# Patient Record
Sex: MALE | Race: WHITE | HISPANIC OR LATINO
[De-identification: names, ages, dates, MRNs, and addresses within clinical notes are randomized per-mention and may not be internally consistent; named-entity substitution may affect disease eponyms.]

---

## 2023-12-14 ENCOUNTER — EXTERNAL LAB (OUTPATIENT)
Dept: HEALTH INFORMATION MANAGEMENT | Facility: OTHER | Age: 68
End: 2023-12-14

## 2023-12-14 LAB — BNP SERPL-MCNC: 25 PG/ML

## 2024-03-18 ENCOUNTER — EXTERNAL RECORD (OUTPATIENT)
Dept: HEALTH INFORMATION MANAGEMENT | Facility: OTHER | Age: 69
End: 2024-03-18

## 2024-06-03 ENCOUNTER — EXTERNAL RECORD (OUTPATIENT)
Dept: HEALTH INFORMATION MANAGEMENT | Facility: OTHER | Age: 69
End: 2024-06-03

## 2024-12-14 ENCOUNTER — HOSPITAL ENCOUNTER (EMERGENCY)
Facility: HOSPITAL | Age: 69
Discharge: HOME | End: 2024-12-14
Attending: EMERGENCY MEDICINE
Payer: MEDICARE

## 2024-12-14 ENCOUNTER — APPOINTMENT (OUTPATIENT)
Dept: CT IMAGING | Facility: HOSPITAL | Age: 69
End: 2024-12-14
Payer: MEDICARE

## 2024-12-14 VITALS
DIASTOLIC BLOOD PRESSURE: 93 MMHG | HEART RATE: 58 BPM | RESPIRATION RATE: 14 BRPM | SYSTOLIC BLOOD PRESSURE: 136 MMHG | OXYGEN SATURATION: 97 % | TEMPERATURE: 98 F | WEIGHT: 197.31 LBS | HEIGHT: 70 IN | BODY MASS INDEX: 28.25 KG/M2

## 2024-12-14 DIAGNOSIS — G51.0 BELL'S PALSY: Primary | ICD-10-CM

## 2024-12-14 LAB — GLUCOSE BLDC GLUCOMTR-MCNC: 99 MG/DL (ref 70–99)

## 2024-12-14 PROCEDURE — 99283 EMERGENCY DEPT VISIT LOW MDM: CPT

## 2024-12-14 PROCEDURE — 82962 GLUCOSE BLOOD TEST: CPT

## 2024-12-14 PROCEDURE — 99284 EMERGENCY DEPT VISIT MOD MDM: CPT

## 2024-12-14 RX ORDER — ARTIFICIAL TEARS 1; 2; 3 MG/ML; MG/ML; MG/ML
1 SOLUTION/ DROPS OPHTHALMIC 4 TIMES DAILY PRN
Qty: 15 ML | Refills: 12 | Status: SHIPPED | OUTPATIENT
Start: 2024-12-14 | End: 2025-12-14

## 2024-12-14 RX ORDER — PREDNISONE 20 MG/1
60 TABLET ORAL DAILY
Qty: 21 TABLET | Refills: 0 | Status: SHIPPED | OUTPATIENT
Start: 2024-12-14 | End: 2024-12-21

## 2024-12-14 RX ORDER — ATORVASTATIN CALCIUM 40 MG/1
40 TABLET, FILM COATED ORAL NIGHTLY
COMMUNITY

## 2024-12-14 RX ORDER — HYDROCHLOROTHIAZIDE 25 MG/1
25 TABLET ORAL DAILY
COMMUNITY

## 2024-12-14 RX ORDER — ENALAPRIL MALEATE 20 MG/1
20 TABLET ORAL DAILY
COMMUNITY

## 2024-12-14 RX ORDER — TAMSULOSIN HYDROCHLORIDE 0.4 MG/1
0.4 CAPSULE ORAL DAILY
COMMUNITY

## 2024-12-15 NOTE — ED PROVIDER NOTES
Patient Seen in: [facility]    History     Chief Complaint   Patient presents with    Stroke       HPI    History is provided by patient/independent historian: Patient,   69 year old male with patient's wife history of hypertension, hyperlipidemia, here with complaints of waking up at 11 AM yesterday with left-sided facial droop.  Patient states that he is having excessive tearing out of the left eye.  He denies any numbness or tingling or weakness of the extremities contrary to triage note.  He is ambulatory without assistance.  Patient's wife reports she noticed the left side of his face is drooping.  And was concern for stroke.    History reviewed.   Past Medical History:    Essential hypertension    Hyperlipidemia         History reviewed. History reviewed. No pertinent surgical history.      Home Medications reviewed :  Prescriptions Prior to Admission[1]      History reviewed.   Social History     Socioeconomic History    Marital status:          ROS  Review of Systems   Respiratory:  Negative for shortness of breath.    Cardiovascular:  Negative for chest pain.   Neurological:  Positive for facial asymmetry.   All other systems reviewed and are negative.     All other pertinent organ systems are reviewed and are negative.      Physical Exam     ED Triage Vitals [12/14/24 1846]   BP (!) 181/88   Pulse 64   Resp 20   Temp 98.4 °F (36.9 °C)   Temp src Temporal   SpO2 97 %   O2 Device None (Room air)     Vital signs reviewed.      Physical Exam  Vitals and nursing note reviewed.   Cardiovascular:      Pulses: Normal pulses.   Pulmonary:      Effort: No respiratory distress.   Abdominal:      General: There is no distension.   Neurological:      Mental Status: He is alert.      Comments: Complete L upper and lower facial palsy. No dysarthria, 5/5 strength in b/l UEs, normal sensation to light touch in all extremities, normal finger to nose b/l, no pronator drift, normal heel to  shin         ED Course       Labs:     Labs Reviewed   POCT GLUCOSE - Normal   RAINBOW DRAW LAVENDER   RAINBOW DRAW LIGHT GREEN   RAINBOW DRAW BLUE   RAINBOW DRAW GOLD         My EKG Interpretation:   As reviewed and Interpreted by me      Imaging Results Available and Reviewed while in ED:   No results found.    Decision rules/scores evaluated: none      Diagnostic labs/tests considered but not ordered: CBC, BMP, UA, CT brain    ED Medications Administered: Medications - No data to display             MDM       Medical Decision Making      Differential Diagnosis: After obtaining the patient's history, performing the physical exam and reviewing the diagnostics, multiple initial diagnoses were considered based on the presenting problem including CVA, bell's palsy, hypoglycemia    External document review: I personally reviewed available external medical records for any recent pertinent discharge summaries, testing, and procedures - the findings are as follows: 4/23/24 visit with Dr. Medina for knee pain    Complicating Factors: The patient already  has a past medical history of Essential hypertension and Hyperlipidemia. to contribute to the complexity of this ED evaluation.    Procedures performed: none    Discussed management with physician/appropriate source: none    Considered admission/deescalation of care for: none    Social determinants of health affecting patient care: none    Prescription medications considered: prednisone, discussed continuing current medication regimen    The patient requires continuous monitoring for: facial asymmetry    Shared decision making: discussed possible admission        Disposition and Plan     Clinical Impression:  1. Bell's palsy        Disposition:  Discharge    Follow-up:  Bella Charlton MD  [address] [place] [address] [id number] [phone]    Follow up        Medications Prescribed:  Current Discharge Medication List        START taking these  medications    Details   predniSONE 20 MG Oral Tab Take 3 tablets (60 mg total) by mouth daily for 7 days.  Qty: 21 tablet, Refills: 0      Artificial Tears Ophthalmic Solution Place 1 drop into the left eye 4 (four) times daily as needed.  Qty: 15 mL, Refills: 12                            [1] (Not in a hospital admission)

## 2024-12-15 NOTE — ED INITIAL ASSESSMENT (HPI)
69y M to ED via personal car with c/o facial droop. Patient reports he woke yesterday around 11am with inability to move left side of face. Patient reports the day before onset, he did have a headache. Patient also with decreased sensation to left extremities. 8/10 headache today.

## 2025-03-11 ENCOUNTER — HOSPITAL ENCOUNTER (OUTPATIENT)
Dept: MRI IMAGING | Age: 70
Discharge: HOME | End: 2025-03-11
Payer: MEDICARE

## 2025-03-11 DIAGNOSIS — T14.8XXA NERVE DAMAGE: ICD-10-CM

## 2025-03-11 PROCEDURE — 70551 MRI BRAIN STEM W/O DYE: CPT

## 2025-04-28 ENCOUNTER — ORDER TRANSCRIPTION (OUTPATIENT)
Dept: PHYSICAL THERAPY | Facility: HOSPITAL | Age: 70
End: 2025-04-28

## 2025-04-28 DIAGNOSIS — G51.0 BELL'S PALSY: Primary | ICD-10-CM

## 2025-05-19 ENCOUNTER — OFFICE VISIT (OUTPATIENT)
Dept: NEUROLOGY | Facility: CLINIC | Age: 70
End: 2025-05-19
Payer: MEDICARE

## 2025-05-19 ENCOUNTER — LAB ENCOUNTER (OUTPATIENT)
Dept: LAB | Age: 70
End: 2025-05-19
Attending: Other
Payer: MEDICARE

## 2025-05-19 DIAGNOSIS — G51.0 BELL PALSY: ICD-10-CM

## 2025-05-19 DIAGNOSIS — G51.0 BELL PALSY: Primary | ICD-10-CM

## 2025-05-19 PROBLEM — N13.8 BENIGN PROSTATIC HYPERPLASIA WITH URINARY OBSTRUCTION: Status: ACTIVE | Noted: 2025-05-19

## 2025-05-19 PROBLEM — K02.9 DENTAL CARIES: Status: ACTIVE | Noted: 2025-05-19

## 2025-05-19 PROBLEM — K05.30 PERIODONTITIS: Status: ACTIVE | Noted: 2025-05-19

## 2025-05-19 PROBLEM — N40.1 BENIGN PROSTATIC HYPERPLASIA WITH URINARY OBSTRUCTION: Status: ACTIVE | Noted: 2025-05-19

## 2025-05-19 PROBLEM — K64.8 INTERNAL HEMORRHOIDS: Status: ACTIVE | Noted: 2025-05-19

## 2025-05-19 PROBLEM — K57.90 DIVERTICULAR DISEASE: Status: ACTIVE | Noted: 2025-05-19

## 2025-05-19 PROBLEM — R51.9 CHRONIC HEADACHE DISORDER: Status: ACTIVE | Noted: 2025-05-19

## 2025-05-19 PROBLEM — G89.29 CHRONIC HEADACHE DISORDER: Status: ACTIVE | Noted: 2025-05-19

## 2025-05-19 PROCEDURE — 86618 LYME DISEASE ANTIBODY: CPT

## 2025-05-19 PROCEDURE — 82164 ANGIOTENSIN I ENZYME TEST: CPT

## 2025-05-19 PROCEDURE — 36415 COLL VENOUS BLD VENIPUNCTURE: CPT

## 2025-05-19 RX ORDER — SILDENAFIL 100 MG/1
TABLET, FILM COATED ORAL
COMMUNITY
Start: 2025-03-21

## 2025-05-19 NOTE — PROGRESS NOTES
[facility] [address], [address]  [facility] [address]  [phone]            Neurology Initial Visit     Referred By: Dr. Isaac ref. provider found    Chief Complaint:   Chief Complaint   Patient presents with    Neurologic Problem     Patient presents here today to establish care, patient was referred by Amada Medina MD to evaluate and treat Bell's palsy, nerve damage. Patient c/o left eye unable to close and left side mouth able to eat or chew. Since December 2024.     Patient gives verbal consent to use [facility].          HPI:     History of Present Illness  Kenneth Perez is a 70 year old male with high blood pressure who presents with facial weakness.    He experienced the onset of facial weakness on December 13, 2024, describing it as his face being twisted. Since the onset, he has noted improvement, stating that he is better now compared to when it first occurred.    He underwent an MRI of the brain a couple of months ago. Initially, he was unable to fully close his left eye, but he reports that he can do so now. No tingling or numbness in his arms or legs, although he mentions mild knee pain, which he considers normal. He notes a slight difference in sensation on one side of his face compared to the other. This is the first occurrence of such facial weakness, and he does not recall any similar episodes in the past.      Past Medical History[1]    Past Surgical History[2]    Social history:  History   Smoking Status    Not on file   Smokeless Tobacco    Not on file     History   Alcohol use Not on file     History   Drug Use Not on file       Family History[3]    Medications - Current[4]    Allergies[5]    ROS:   As in HPI, the rest of the 14 system review was done and was negative      Physical Exam:  There were no vitals filed for this visit.    General: No apparent distress, well nourished, well groomed.  Head- Normocephalic, atraumatic  Eyes- No redness or  swelling  ENT- Hearing intake, normal glutition  Neck- No masses or adenopathy  Cv: pulses were palpable and normal, no cyanosis or edema     Neurological:     Mental Status- Alert     Cranial Nerves:    VII. Face shows asymmetry, with weakness of the left side of the face.  However able to close the eye.  VIII. Hearing intact to whisper.  IX. Pallet elevates symmetrically.  XI. Shoulder shrug is intact  XII. Tongue is midline    Motor Exam:  Muscle tone normal  No atrophy or fasciculations  Strength- upper extremities 5/5 proximally and distally                  - lower  extremities 5/5 proximally and distally    Coordination:  Finger to nose intact  Rapid alternating movements intact    Gait:  Normal posture  Normal physiologic      Labs:    No results found for: \"TSH\"  No results found for: \"CHOL\", \"HDL\", \"LDL\", \"TRIG\"  No results found for: \"HGB\", \"HCT\", \"MCV\", \"WBC\", \"ALC\", \"PLT\"   No results found for: \"GLUCOSE\", \"BUN\", \"CREAT\", \"CA\", \"ALT\", \"AST\", \"ALKPHOS\", \"ALB\", \"NA\", \"K\", \"CL\", \"CO2\"   I have reviewed labs.    Imaging Studies:  I have independently reviewed imaging.  MRI of the brain as above.        Assessment   1. Bell palsy    - Physical Therapy Referral - [place] Locations  - Lyme Disease, Total Ab W Rflx; Future  - Angiotensin Convert Enz [ACE][E]; Future      Assessment & Plan  Bell's palsy  Facial nerve damage from viral inflammation?. MRI negative for stroke or tumor. Persistent facial asymmetry. Inflammation typically resolves spontaneously, but permanent damage possible. No pharmacological nerve regeneration available.  - Refer to physical therapy for facial exercises.  - Order blood tests to rule out other causes of inflammation, such as Lyme disease.    Essential hypertension  No changes in management discussed.        Education and counseling provided to patient. Instructed patient to call my office or seek medical attention immediately if symptoms worsen.  Patient verbalized understanding  of information given. All questions were answered. All side effects of drugs were discussed.       Return to clinic in: Return if symptoms worsen or fail to improve.    Bashir Mcdaniel MD           [1]   Past Medical History:   Essential hypertension    Hyperlipidemia   [2] No past surgical history on file.  [3] No family history on file.  [4]   Current Outpatient Medications:     Sildenafil Citrate 100 MG Oral Tab, , Disp: , Rfl:     tamsulosin 0.4 MG Oral Cap, Take 1 capsule (0.4 mg total) by mouth daily., Disp: , Rfl:     enalapril 20 MG Oral Tab, Take 1 tablet (20 mg total) by mouth daily., Disp: , Rfl:     atorvastatin 40 MG Oral Tab, Take 1 tablet (40 mg total) by mouth nightly., Disp: , Rfl:     hydroCHLOROthiazide 25 MG Oral Tab, Take 1 tablet (25 mg total) by mouth daily., Disp: , Rfl:     Artificial Tears Ophthalmic Solution, Place 1 drop into the left eye 4 (four) times daily as needed., Disp: 15 mL, Rfl: 12  [5] No Known Allergies

## 2025-05-20 LAB — ACE: 57 U/L

## 2025-05-21 LAB — B BURGDOR IGG+IGM SER QL: NEGATIVE

## 2025-06-10 ENCOUNTER — TELEPHONE (OUTPATIENT)
Dept: PHYSICAL THERAPY | Facility: HOSPITAL | Age: 70
End: 2025-06-10

## 2025-06-12 ENCOUNTER — OFFICE VISIT (OUTPATIENT)
Dept: PHYSICAL THERAPY | Facility: HOSPITAL | Age: 70
End: 2025-06-12
Payer: MEDICARE

## 2025-06-12 DIAGNOSIS — G51.0 BELL'S PALSY: Primary | ICD-10-CM

## 2025-06-12 PROCEDURE — 97112 NEUROMUSCULAR REEDUCATION: CPT

## 2025-06-12 PROCEDURE — 97161 PT EVAL LOW COMPLEX 20 MIN: CPT

## 2025-06-12 NOTE — PROGRESS NOTES
NEUROLOGICAL EVALUATION:     Diagnosis:   Bell's Palsy, L sided facial weakness Patient:  Kenneth Perez (70 year old, male)        Referring Provider: Amada Medina   Neurologist: Violeta  Today's Date   6/12/2025    Precautions:  None Date of Evaluation: 06/12/25       PATIENT SUMMARY     Summary of chief complaints:  Bell's Palsy resulting in L sided facial weakness that has significantly improved per pt report.     History of current condition:  Pt reports onset of symptoms December 2024. He reports he woke up he noted an odd sensation on the right side of his mouth like a \"pimple\". However later that day his wife noted L facial droop. Went to the ED due to symptoms and told he had Bell's Palsy. Imaging of brain normal. No prior PT for these issues.      Pain level: denies pain     Occupation:  works for a [employer] company.       Prior level of function:  unlimited   Current limitations:  excessive tearing of L eye \"at times\"  Pt goals:  to ensure full resolution of symptoms    Past medical history was reviewed with Kenneth.  No significant PMH      ASSESSMENT  Kenneth presents to physical therapy evaluation with reports of recent diagnosis of Bell's Palsy resulting in L sided facial weakness and dysfunction . He reports symptoms are nearly resolved. Primary complaint is periodic excessive tearing of L eye. No tearing noted in session today. Pt generally with full strength with exception of movement of L side of mouth as documented below. Pt verbalized and demo'd understanding of material taught today. He would benefit from one additional visit to monitor progress with provided home program.      OBJECTIVE:     Observation/Posture:   Pleasant 69 y/o; NAD. Symmetrical tone of face at rest. Declines - son translates in session as needed     Facial Disability Index:   Physical Function: 80%  Social/Well Being Function: 96%    Facial Nerve Volitional Movement Assessment  (0%= no movement, 100%= full  movement)    Raise eyebrows  ___100___  Bring eyebrows down and together ___100___  Gently close eyes__100___  Squint your lower eyelids up__100__  Close eyes tightly__100__  Smile evenly with your lips together_90___  Big wide smile with lips apart__80__  Raise upper lip while wrinkling nose__100__  Move corner of mouth back toward ear__100__   Press lips together__100__   Move center of both lips left and right__100__  Pull lips back and in over your teeth__90__  Push lips out as far as they will go (pucker)__80__  Roll lower lip out and down (chin tightening)_90___  Pull lower lip down to expose lower teeth__90__  Tighten your neck__90__  Make an “o” with lips__80__  Blow cheeks out with air__100, no leak__       Today's Treatment and Response:   Pt education was provided on exam findings and POC    Today's Treatment       6/12/2025   Neuro Treatment   Neuro Re-Education Instructed in self massage -demo provided, rationale explained.   Smile showing teeth AA L- demo with return demo   Pucker AA L -demo with return demo   Holding wooden stick level between lips-->moving stick L<>center    Neuro Re-Educ Minutes 12   Evaluation Minutes 23   Total Time Of Timed Procedures 12   Total Time Of Service-Based Procedures 23   Total Treatment Time 35        Patient was instructed in and issued a HEP- see flowsheet     Charges:  PT EVAL: Low Complexity, 1 NMR                                    PLAN OF CARE:      Goals: (to be met in 2 visits)   Pt to be independent in HEP.   2. Pt will demo symmetrical, full tooth smile.      Frequency / Duration: Patient will be seen 1x/week or a total of 2 visits over a 90 day period. Treatment will include: Self-Care Home Management; Neuromuscular Re-education; Therapeutic Activities; Therapeutic Exercise; Manual Therapy    Education or treatment limitation: None   Rehab Potential: good     Patient/Family/Caregiver was advised of these findings, precautions, and treatment options and has  agreed to actively participate in planning and for this course of care.    Thank you for your referral. Please co-sign or sign and return this letter via fax as soon as possible to [phone]. If you have any questions, please contact me at Dept: [phone]    Sincerely,  Belgica Christiansen PT, NCS    Electronically signed by therapist: Belgica Christiansen PT  Physician's certification required: Yes  I certify the need for these services furnished under this plan of treatment and while under my care.    X___________________________________________________ Date____________________    Certification From: 6/12/2025  To: 9/10/2025

## 2025-06-16 ENCOUNTER — ORDER TRANSCRIPTION (OUTPATIENT)
Dept: PHYSICAL THERAPY | Facility: HOSPITAL | Age: 70
End: 2025-06-16

## 2025-06-16 DIAGNOSIS — G51.0 BELL'S PALSY: Primary | ICD-10-CM

## 2025-06-19 ENCOUNTER — APPOINTMENT (OUTPATIENT)
Dept: PHYSICAL THERAPY | Facility: HOSPITAL | Age: 70
End: 2025-06-19
Payer: MEDICARE

## 2025-06-26 ENCOUNTER — APPOINTMENT (OUTPATIENT)
Dept: PHYSICAL THERAPY | Facility: HOSPITAL | Age: 70
End: 2025-06-26
Payer: MEDICARE

## 2025-07-03 ENCOUNTER — APPOINTMENT (OUTPATIENT)
Dept: PHYSICAL THERAPY | Facility: HOSPITAL | Age: 70
End: 2025-07-03